# Patient Record
Sex: MALE | Race: WHITE | NOT HISPANIC OR LATINO | URBAN - METROPOLITAN AREA
[De-identification: names, ages, dates, MRNs, and addresses within clinical notes are randomized per-mention and may not be internally consistent; named-entity substitution may affect disease eponyms.]

---

## 2019-12-18 ENCOUNTER — EMERGENCY (EMERGENCY)
Facility: HOSPITAL | Age: 29
LOS: 1 days | Discharge: HOME | End: 2019-12-18
Attending: EMERGENCY MEDICINE | Admitting: EMERGENCY MEDICINE
Payer: COMMERCIAL

## 2019-12-18 VITALS
TEMPERATURE: 98 F | HEIGHT: 72 IN | DIASTOLIC BLOOD PRESSURE: 61 MMHG | RESPIRATION RATE: 18 BRPM | SYSTOLIC BLOOD PRESSURE: 108 MMHG | WEIGHT: 179.9 LBS | HEART RATE: 96 BPM | OXYGEN SATURATION: 99 %

## 2019-12-18 PROCEDURE — 99283 EMERGENCY DEPT VISIT LOW MDM: CPT

## 2019-12-18 NOTE — ED PROVIDER NOTE - OBJECTIVE STATEMENT
30 yo M, previously well, presents with superficial skin laceration to R thumb, sustained with metal tray. tetanus current. denies other injury. R hand dominant. student.

## 2019-12-18 NOTE — ED PROVIDER NOTE - NSFOLLOWUPINSTRUCTIONS_ED_ALL_ED_FT
Maintain wound clean, wash with soap and water, wrap with retangular bandage.     Return for signs of infection such as swelling, redness, or drainage from wound.

## 2019-12-18 NOTE — ED PROVIDER NOTE - PATIENT PORTAL LINK FT
You can access the FollowMyHealth Patient Portal offered by [facility] by registering at the following website: [email or web address]. By joining [facility]’s FollowMyHealth portal, you will also be able to view your health information using other applications (apps) compatible with our system.

## 2019-12-23 DIAGNOSIS — S61.011A LACERATION WITHOUT FOREIGN BODY OF RIGHT THUMB WITHOUT DAMAGE TO NAIL, INITIAL ENCOUNTER: ICD-10-CM
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
## 2019-12-23 DIAGNOSIS — S61.001A UNSPECIFIED OPEN WOUND OF RIGHT THUMB WITHOUT DAMAGE TO NAIL, INITIAL ENCOUNTER: ICD-10-CM
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
## 2020-06-01 NOTE — ED ADULT NURSE NOTE - BREATHING, MLM
OVERNIGHT EVENTS: KRYS    SUBJECTIVE / INTERVAL HPI: Patient seen and examined at bedside. Patient remains lethargic but responds appropriately to commands. Denies any pain. says he is weka. 12 point ROS otherwise negative.     VITAL SIGNS:  Vital Signs Last 24 Hrs  T(C): 36.4 (01 Jun 2020 09:43), Max: 36.5 (01 Jun 2020 00:44)  T(F): 97.6 (01 Jun 2020 09:43), Max: 97.7 (01 Jun 2020 00:44)  HR: 87 (01 Jun 2020 14:00) (82 - 95)  BP: 98/54 (01 Jun 2020 14:00) (60/36 - 98/54)  BP(mean): 69 (01 Jun 2020 14:00) (44 - 71)  RR: 10 (01 Jun 2020 14:00) (10 - 22)  SpO2: 99% (01 Jun 2020 14:00) (92% - 100%)  I&O's Summary    31 May 2020 07:01  -  01 Jun 2020 07:00  --------------------------------------------------------  IN: 251 mL / OUT: 45 mL / NET: 206 mL    01 Jun 2020 07:01  -  01 Jun 2020 14:37  --------------------------------------------------------  IN: 22 mL / OUT: 15 mL / NET: 7 mL        PHYSICAL EXAM:    General: NAD, resting comfortably in bed. Breathing well on room air  Neck: no JVD  Respiratory: diminished breath sounds at the bases, non-tachypneic, no respiratory distress   Cardiovascular: Regular rhythm/rate; S1/S2, no pericardial rub, pericardial drain removed with site clean, dry, and intact  Gastrointestinal: distended, non tender ascitic drain with appropriate drainage, site clean, dry, and intact  Extremities: WWP; 2+ bilateral dependent pitting edema up to his thigh  Neurological:  A&Ox 3, arousable and interactive but still weak. Is able to follow simple commands and answer simple questions. CNII-XII grossly intact; no obvious focal deficits, no flapping tremor, but minimal asterixis  T/L/D: palacio present, currently anuric  Skin: petechiae diffusely     MEDICATIONS:  MEDICATIONS  (STANDING):  albumin human 25% IVPB 50 milliLiter(s) IV Intermittent every 1 hour  chlorhexidine 2% Cloths 1 Application(s) Topical daily  collagenase Ointment 1 Application(s) Topical daily  DAPTOmycin IVPB 700 milliGRAM(s) IV Intermittent every 48 hours  finasteride 5 milliGRAM(s) Oral daily  hydrocortisone sodium succinate Injectable 50 milliGRAM(s) IV Push every 8 hours  insulin glargine Injectable (LANTUS) 10 Unit(s) SubCutaneous at bedtime  insulin lispro (HumaLOG) corrective regimen sliding scale   SubCutaneous Before meals and at bedtime  midodrine 15 milliGRAM(s) Oral every 8 hours  mirtazapine 7.5 milliGRAM(s) Oral every 24 hours  norepinephrine Infusion 0.045 MICROgram(s)/kG/Min (3 mL/Hr) IV Continuous <Continuous>  nystatin Cream 1 Application(s) Topical two times a day  pantoprazole  Injectable 40 milliGRAM(s) IV Push daily  psyllium Powder 1 Packet(s) Oral daily  sertraline 50 milliGRAM(s) Oral daily  sevelamer carbonate Powder 1600 milliGRAM(s) Oral three times a day with meals  sodium bicarbonate 650 milliGRAM(s) Oral three times a day  sodium zirconium cyclosilicate 10 Gram(s) Oral every 8 hours    MEDICATIONS  (PRN):  guaiFENesin   Syrup  (Sugar-Free) 200 milliGRAM(s) Oral every 6 hours PRN Cough      ALLERGIES:  Allergies    No Known Allergies    Intolerances        LABS:                        8.0    4.41  )-----------( 33       ( 01 Jun 2020 05:23 )             26.2     06-01    143  |  106  |  136<H>  ----------------------------<  226<H>  5.1   |  15<L>  |  7.81<H>    Ca    8.4      01 Jun 2020 05:22  Phos  9.9     06-01  Mg     2.2     06-01    TPro  5.1<L>  /  Alb  2.5<L>  /  TBili  0.7  /  DBili  x   /  AST  20  /  ALT  7<L>  /  AlkPhos  91  06-01    PT/INR - ( 01 Jun 2020 05:23 )   PT: 18.8 sec;   INR: 1.62          PTT - ( 01 Jun 2020 05:23 )  PTT:35.0 sec    CAPILLARY BLOOD GLUCOSE      POCT Blood Glucose.: 218 mg/dL (01 Jun 2020 11:51)      RADIOLOGY & ADDITIONAL TESTS: Reviewed. Spontaneous, unlabored and symmetrical

## 2021-01-23 ENCOUNTER — IMMUNIZATION (OUTPATIENT)
Dept: LAB | Age: 31
End: 2021-01-23

## 2021-01-23 DIAGNOSIS — Z23 NEED FOR VACCINATION: Primary | ICD-10-CM

## 2021-01-23 PROCEDURE — 91301 COVID-19 MODERNA VACCINE: CPT

## 2021-01-23 PROCEDURE — 0011A COVID-19 MODERNA VACCINE: CPT

## 2021-02-20 ENCOUNTER — IMMUNIZATION (OUTPATIENT)
Dept: LAB | Age: 31
End: 2021-02-20

## 2021-02-20 DIAGNOSIS — Z23 NEED FOR VACCINATION: Primary | ICD-10-CM

## 2021-02-20 PROCEDURE — 91301 COVID-19 MODERNA VACCINE: CPT | Performed by: PREVENTIVE MEDICINE

## 2021-02-20 PROCEDURE — 0012A COVID-19 MODERNA VACCINE: CPT | Performed by: PREVENTIVE MEDICINE
